# Patient Record
Sex: MALE | Race: WHITE | NOT HISPANIC OR LATINO | URBAN - METROPOLITAN AREA
[De-identification: names, ages, dates, MRNs, and addresses within clinical notes are randomized per-mention and may not be internally consistent; named-entity substitution may affect disease eponyms.]

---

## 2022-12-28 ENCOUNTER — CLAIMS CREATED FROM THE CLAIM WINDOW (OUTPATIENT)
Dept: URBAN - METROPOLITAN AREA CLINIC 19 | Facility: CLINIC | Age: 57
End: 2022-12-28
Payer: COMMERCIAL

## 2022-12-28 ENCOUNTER — LAB OUTSIDE AN ENCOUNTER (OUTPATIENT)
Dept: URBAN - METROPOLITAN AREA CLINIC 19 | Facility: CLINIC | Age: 57
End: 2022-12-28

## 2022-12-28 ENCOUNTER — WEB ENCOUNTER (OUTPATIENT)
Dept: URBAN - METROPOLITAN AREA CLINIC 19 | Facility: CLINIC | Age: 57
End: 2022-12-28

## 2022-12-28 VITALS
HEIGHT: 71 IN | DIASTOLIC BLOOD PRESSURE: 60 MMHG | TEMPERATURE: 98.1 F | WEIGHT: 182 LBS | BODY MASS INDEX: 25.48 KG/M2 | SYSTOLIC BLOOD PRESSURE: 120 MMHG

## 2022-12-28 DIAGNOSIS — Z86.79 HISTORY OF PORTAL HYPERTENSION: ICD-10-CM

## 2022-12-28 DIAGNOSIS — K74.60 CIRRHOSIS: ICD-10-CM

## 2022-12-28 DIAGNOSIS — E83.110 HEREDITARY HEMOCHROMATOSIS: ICD-10-CM

## 2022-12-28 PROBLEM — 35400008: Status: ACTIVE | Noted: 2022-12-28

## 2022-12-28 PROCEDURE — 99245 OFF/OP CONSLTJ NEW/EST HI 55: CPT | Performed by: NURSE PRACTITIONER

## 2022-12-28 PROCEDURE — 99205 OFFICE O/P NEW HI 60 MIN: CPT | Performed by: INTERNAL MEDICINE

## 2022-12-28 PROCEDURE — 99245 OFF/OP CONSLTJ NEW/EST HI 55: CPT | Performed by: INTERNAL MEDICINE

## 2022-12-28 PROCEDURE — 99204 OFFICE O/P NEW MOD 45 MIN: CPT | Performed by: INTERNAL MEDICINE
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
## 2022-12-29 LAB
A/G RATIO: 1.6
AFP, SERUM, TUMOR MARKER: 2.6
ALBUMIN: 4.4
ALKALINE PHOSPHATASE: 76
ALT (SGPT): 44
AST (SGOT): 51
BASO (ABSOLUTE): 0
BASOS: 1
BILIRUBIN, TOTAL: 0.5
BUN/CREATININE RATIO: 8
BUN: 9
CALCIUM: 9.5
CARBON DIOXIDE, TOTAL: 26
CHLORIDE: 103
CREATININE: 1.12
EGFR: 77
EOS (ABSOLUTE): 0.1
EOS: 3
GLOBULIN, TOTAL: 2.7
GLUCOSE: 72
HEMATOCRIT: 43.6
HEMATOLOGY COMMENTS:: (no result)
HEMOGLOBIN: 14.6
IMMATURE CELLS: (no result)
IMMATURE GRANS (ABS): 0
IMMATURE GRANULOCYTES: 0
INR: 1.1
LYMPHS (ABSOLUTE): 1.3
LYMPHS: 30
Lab: (no result)
MCH: 32.6
MCHC: 33.5
MCV: 97
MONOCYTES(ABSOLUTE): 0.5
MONOCYTES: 11
NEUTROPHILS (ABSOLUTE): 2.5
NEUTROPHILS: 55
NRBC: (no result)
PLATELETS: 103
POTASSIUM: 4
PROTEIN, TOTAL: 7.1
PROTHROMBIN TIME: 11.3
RBC: 4.48
RDW: 12.9
SODIUM: 142
WBC: 4.5

## 2023-01-25 ENCOUNTER — OFFICE VISIT (OUTPATIENT)
Dept: URBAN - METROPOLITAN AREA CLINIC 18 | Facility: CLINIC | Age: 58
End: 2023-01-25

## 2023-01-27 ENCOUNTER — OFFICE VISIT (OUTPATIENT)
Dept: URBAN - METROPOLITAN AREA SURGERY CENTER 31 | Facility: SURGERY CENTER | Age: 58
End: 2023-01-27
Payer: COMMERCIAL

## 2023-01-27 ENCOUNTER — CLAIMS CREATED FROM THE CLAIM WINDOW (OUTPATIENT)
Dept: URBAN - METROPOLITAN AREA CLINIC 4 | Facility: CLINIC | Age: 58
End: 2023-01-27
Payer: COMMERCIAL

## 2023-01-27 DIAGNOSIS — K31.89 ACQUIRED DEFORMITY OF DUODENUM: ICD-10-CM

## 2023-01-27 DIAGNOSIS — K29.70 GASTRITIS, UNSPECIFIED, WITHOUT BLEEDING: ICD-10-CM

## 2023-01-27 DIAGNOSIS — K74.69 CIRRHOSIS, CRYPTOGENIC: ICD-10-CM

## 2023-01-27 DIAGNOSIS — K21.9 ACID REFLUX: ICD-10-CM

## 2023-01-27 DIAGNOSIS — K76.6 CLINICALLY SIGNIFICANT PORTAL HYPERTENSION: ICD-10-CM

## 2023-01-27 PROCEDURE — 88305 TISSUE EXAM BY PATHOLOGIST: CPT | Performed by: PATHOLOGY

## 2023-01-27 PROCEDURE — 43239 EGD BIOPSY SINGLE/MULTIPLE: CPT | Performed by: STUDENT IN AN ORGANIZED HEALTH CARE EDUCATION/TRAINING PROGRAM

## 2023-01-27 PROCEDURE — 88312 SPECIAL STAINS GROUP 1: CPT | Performed by: PATHOLOGY

## 2023-01-27 PROCEDURE — G8907 PT DOC NO EVENTS ON DISCHARG: HCPCS | Performed by: STUDENT IN AN ORGANIZED HEALTH CARE EDUCATION/TRAINING PROGRAM

## 2024-03-21 ENCOUNTER — OV EP (OUTPATIENT)
Dept: RURAL CLINIC 4 | Facility: CLINIC | Age: 59
End: 2024-03-21
Payer: COMMERCIAL

## 2024-03-21 ENCOUNTER — LAB (OUTPATIENT)
Dept: RURAL CLINIC 4 | Facility: CLINIC | Age: 59
End: 2024-03-21

## 2024-03-21 VITALS
HEIGHT: 71 IN | BODY MASS INDEX: 25.06 KG/M2 | DIASTOLIC BLOOD PRESSURE: 94 MMHG | HEART RATE: 66 BPM | TEMPERATURE: 96.9 F | WEIGHT: 179 LBS | SYSTOLIC BLOOD PRESSURE: 134 MMHG

## 2024-03-21 DIAGNOSIS — D69.6 THROMBOCYTOPENIA: ICD-10-CM

## 2024-03-21 DIAGNOSIS — K76.6 PORTAL HYPERTENSION: ICD-10-CM

## 2024-03-21 DIAGNOSIS — K31.89 OTHER DISEASES OF STOMACH AND DUODENUM: ICD-10-CM

## 2024-03-21 DIAGNOSIS — E83.110 HEREDITARY HEMOCHROMATOSIS: ICD-10-CM

## 2024-03-21 DIAGNOSIS — K74.60 CIRRHOSIS: ICD-10-CM

## 2024-03-21 DIAGNOSIS — B19.20 HEPATITIS C TEST POSITIVE: ICD-10-CM

## 2024-03-21 PROBLEM — 303082009: Status: ACTIVE | Noted: 2024-03-21

## 2024-03-21 PROBLEM — 119291004: Status: ACTIVE | Noted: 2024-03-21

## 2024-03-21 PROBLEM — 302215000: Status: ACTIVE | Noted: 2024-03-21

## 2024-03-21 PROBLEM — 365865003: Status: ACTIVE | Noted: 2024-03-21

## 2024-03-21 PROCEDURE — 99244 OFF/OP CNSLTJ NEW/EST MOD 40: CPT | Performed by: NURSE PRACTITIONER
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
## 2024-03-21 NOTE — HPI-ZZZTODAY'S VISIT
03/21/2024 The patient is here today on referral from Dr. Sylvain Emerson for positive hepatitis C discovered after giving blood through the [facility].  Further laboratory testing revealed the presence of HCV RNA, consistent with active infection with viral load 4,570,000.  The patient reportedly was tested for hepatitis C a few years ago with a negative finding.  He denies a history of IV drug use in the past or presently and has been in a single relationship with his spouse for the past 30 years.  History of cirrhosis of the liver noted on imaging in 2022, secondary to alcohol use and reportedly stopped 3 to 4 months ago.  His most recent upper endoscopy was done in January 2023 by Dr. Amaral with no endoscopic evidence of varices, irregular Z-line, portal hypertension gastropathy with antral biopsies revealing chronic gastropathy negative for H. pylori.  The patient is tobacco dependent of 40 packs/year and is currently trying to quit.  He receives yearly CT scans for his chest due to pulmonary nodules.  Currently he denies abdominal pain, nausea, vomiting, weight loss, lower extremity edema or jaundice.

## 2024-05-16 ENCOUNTER — LAB OUTSIDE AN ENCOUNTER (OUTPATIENT)
Dept: RURAL CLINIC 8 | Facility: CLINIC | Age: 59
End: 2024-05-16

## 2024-05-16 ENCOUNTER — TELEPHONE ENCOUNTER (OUTPATIENT)
Dept: RURAL CLINIC 2 | Facility: CLINIC | Age: 59
End: 2024-05-16

## 2024-05-24 ENCOUNTER — LAB OUTSIDE AN ENCOUNTER (OUTPATIENT)
Dept: RURAL CLINIC 8 | Facility: CLINIC | Age: 59
End: 2024-05-24

## 2024-05-29 LAB
ALPHA 2-MACROGLOBULINS, QN: 366
ALT (SGPT) P5P: 63
APOLIPOPROTEIN A-1: 149
BILIRUBIN, TOTAL: 0.6
COMMENT:: (no result)
FIBROSIS SCORE: 0.78
FIBROSIS SCORING:: (no result)
FIBROSIS STAGE: (no result)
GGT: 162
HAPTOGLOBIN: 86
INTERPRETATIONS:: (no result)
LIMITATIONS:: (no result)
Lab: (no result)
NECROINFLAMM ACTIVITY SCORING:: (no result)
NECROINFLAMMAT ACTIVITY GRADE: (no result)
NECROINFLAMMAT ACTIVITY SCORE: 0.56

## 2024-07-25 ENCOUNTER — OFFICE VISIT (OUTPATIENT)
Dept: RURAL CLINIC 4 | Facility: CLINIC | Age: 59
End: 2024-07-25
Payer: COMMERCIAL

## 2024-07-25 ENCOUNTER — DASHBOARD ENCOUNTERS (OUTPATIENT)
Age: 59
End: 2024-07-25

## 2024-07-25 VITALS
TEMPERATURE: 97.3 F | HEART RATE: 62 BPM | HEIGHT: 71 IN | DIASTOLIC BLOOD PRESSURE: 95 MMHG | WEIGHT: 175 LBS | SYSTOLIC BLOOD PRESSURE: 150 MMHG | BODY MASS INDEX: 24.5 KG/M2

## 2024-07-25 DIAGNOSIS — K74.69 OTHER CIRRHOSIS OF LIVER: ICD-10-CM

## 2024-07-25 DIAGNOSIS — B19.20 HEPATITIS C TEST POSITIVE: ICD-10-CM

## 2024-07-25 PROCEDURE — 99213 OFFICE O/P EST LOW 20 MIN: CPT | Performed by: INTERNAL MEDICINE

## 2024-07-25 RX ORDER — VELPATASVIR AND SOFOSBUVIR 100; 400 MG/1; MG/1
1 TABLET TABLET, FILM COATED ORAL ONCE A DAY
Qty: 30 TABLET | Refills: 2 | OUTPATIENT
Start: 2024-07-25 | End: 2024-10-23

## 2024-07-25 RX ORDER — GABAPENTIN 400 MG/1
1 CAPSULE CAPSULE ORAL ONCE A DAY
Status: ACTIVE | COMMUNITY
Start: 2024-07-25

## 2024-07-25 NOTE — HPI-ZZZTODAY'S VISIT
03/21/2024 The patient is here today on referral from Dr. Sylvain Emerson for positive hepatitis C discovered after giving blood through the [facility].  Further laboratory testing revealed the presence of HCV RNA, consistent with active infection with viral load 4,570,000.  The patient reportedly was tested for hepatitis C a few years ago with a negative finding.  He denies a history of IV drug use in the past or presently and has been in a single relationship with his spouse for the past 30 years.  History of cirrhosis of the liver noted on imaging in 2022, secondary to alcohol use and reportedly stopped 3 to 4 months ago.  His most recent upper endoscopy was done in January 2023 by Dr. Amaral with no endoscopic evidence of varices, irregular Z-line, portal hypertension gastropathy with antral biopsies revealing chronic gastropathy negative for H. pylori.  The patient is tobacco dependent of 40 packs/year and is currently trying to quit.  He receives yearly CT scans for his chest due to pulmonary nodules.  Currently, he denies abdominal pain, nausea, vomiting, weight loss, lower extremity edema or jaundice.  07/25/2024 He is f/u for the treatment of Hepatitis C infection with PCR over 4,000,000 and joby type 1a.   Lab work reviewed and Hepatitis B serology is nonreactive and HIV is negative. Ct imaging shows liver nodularity suggestive of cirrhosis without lesion. H/o compensated liver cirrhosis with current meld score calculated at 8. He offers no c/o abdominal pain, n/v, weight loss, LE edema or weight loss.

## 2024-08-16 ENCOUNTER — TELEPHONE ENCOUNTER (OUTPATIENT)
Dept: RURAL CLINIC 8 | Facility: CLINIC | Age: 59
End: 2024-08-16

## 2024-08-16 ENCOUNTER — LAB OUTSIDE AN ENCOUNTER (OUTPATIENT)
Dept: RURAL CLINIC 4 | Facility: CLINIC | Age: 59
End: 2024-08-16